# Patient Record
(demographics unavailable — no encounter records)

---

## 2025-04-22 NOTE — PHYSICAL EXAM
[Normocephalic] : normocephalic [Atraumatic] : atraumatic [Supple] : supple [No Supraclavicular Adenopathy] : no supraclavicular adenopathy [No Cervical Adenopathy] : no cervical adenopathy [No Thyromegaly] : no thyromegaly [Normal Sinus Rhythm] : normal sinus rhythm [Examined in the supine and seated position] : examined in the supine and seated position [No dominant masses] : no dominant masses in right breast  [No dominant masses] : no dominant masses left breast [No Nipple Retraction] : no left nipple retraction [No Nipple Discharge] : no left nipple discharge [No Axillary Lymphadenopathy] : no left axillary lymphadenopathy [No Edema] : no edema [No Rashes] : no rashes [No Ulceration] : no ulceration [de-identified] : S/P PMX/SLN/RT. NER. %. No lymphedema.  [de-identified] : +FC tissue NSF

## 2025-04-22 NOTE — HISTORY OF PRESENT ILLNESS
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 FC's x 2: 65, 68 > both BRCA-) BRCA ([facility])((2018): - Started Prolia q6 mos (6/15) > has received 6 inj's  Recent HgbA1c: 6 > to see Endocr Mother  of COVID (3/20) at 95yo Had R Rot Cuff Repair (21)(HSS) > ROM RUE improved Pt on Trelegy > improved Pt fell (yesterday (24) > poss R post rib fx > seen in Urgent Care > No hematuria/PTX Cholesterol > 300 > started Crestor (20mg/D) EGD/Colonoscopy (): "WNL" > 5yrs, +GERD 0n EGD > started omeprazole PAP/Pelvic (): "WNL"  Got second Pfizer booster ()(LUE) No MH/FH changes. ROS reviewed/discussed. Taking Vit D. Last Bone Densitometry (): +improvement on Prolia. Now osteopenia Mammo/Sono (24): FG, NSF, benign atherosl calc's > calc CT Score rec'ed